# Patient Record
Sex: FEMALE | URBAN - METROPOLITAN AREA
[De-identification: names, ages, dates, MRNs, and addresses within clinical notes are randomized per-mention and may not be internally consistent; named-entity substitution may affect disease eponyms.]

---

## 2024-08-14 DIAGNOSIS — M48.061 SPINAL STENOSIS OF LUMBAR REGION, UNSPECIFIED WHETHER NEUROGENIC CLAUDICATION PRESENT: Primary | ICD-10-CM

## 2024-08-14 DIAGNOSIS — M47.817 DJD (DEGENERATIVE JOINT DISEASE), LUMBOSACRAL: ICD-10-CM

## 2024-08-19 ENCOUNTER — TELEPHONE (OUTPATIENT)
Dept: PAIN MEDICINE | Facility: CLINIC | Age: 74
End: 2024-08-19

## 2024-08-19 NOTE — TELEPHONE ENCOUNTER
Called pt to discuss location of pain and where imaging had been done. Pt does not remember where imaging was done. We will attempt to reach out to Dr Multani's office to obtain more information that is needed, prior to scheduling.

## 2024-08-20 ENCOUNTER — TELEPHONE (OUTPATIENT)
Dept: PAIN MEDICINE | Facility: CLINIC | Age: 74
End: 2024-08-20
Payer: COMMERCIAL

## 2024-08-20 NOTE — TELEPHONE ENCOUNTER
I spoke with Ms. Carlkiyadavis and she notified me that she has workers comp I informed her our office is not able to see pt with workers comp at this time will fax a communication letter to Dr. Tang office.

## 2024-09-10 ENCOUNTER — TELEPHONE (OUTPATIENT)
Dept: PAIN MEDICINE | Facility: CLINIC | Age: 74
End: 2024-09-10
Payer: COMMERCIAL

## 2024-09-10 NOTE — TELEPHONE ENCOUNTER
Called Dr. Multani's office to inquire about a 1010 form their office is currently closed. At this time the patient dose not have a pending appointment it was canceled per Ms García.

## 2024-09-10 NOTE — TELEPHONE ENCOUNTER
Called Dr. Multani's office to inquire about a 1010 form their office is currently closed. At this time the patient dose not have a pending appointment it was canceled per Ms Rudd